# Patient Record
Sex: MALE | Race: WHITE | NOT HISPANIC OR LATINO | URBAN - METROPOLITAN AREA
[De-identification: names, ages, dates, MRNs, and addresses within clinical notes are randomized per-mention and may not be internally consistent; named-entity substitution may affect disease eponyms.]

---

## 2019-10-18 ENCOUNTER — EMERGENCY (EMERGENCY)
Facility: HOSPITAL | Age: 54
LOS: 1 days | Discharge: HOME | End: 2019-10-18
Attending: EMERGENCY MEDICINE | Admitting: EMERGENCY MEDICINE
Payer: SELF-PAY

## 2019-10-18 VITALS
RESPIRATION RATE: 18 BRPM | DIASTOLIC BLOOD PRESSURE: 89 MMHG | HEART RATE: 81 BPM | SYSTOLIC BLOOD PRESSURE: 150 MMHG | TEMPERATURE: 98 F | WEIGHT: 225.09 LBS | OXYGEN SATURATION: 98 % | HEIGHT: 67 IN

## 2019-10-18 VITALS
HEART RATE: 85 BPM | SYSTOLIC BLOOD PRESSURE: 142 MMHG | DIASTOLIC BLOOD PRESSURE: 81 MMHG | RESPIRATION RATE: 20 BRPM | TEMPERATURE: 98 F | OXYGEN SATURATION: 99 %

## 2019-10-18 PROCEDURE — 99283 EMERGENCY DEPT VISIT LOW MDM: CPT

## 2019-10-18 RX ORDER — CIPROFLOXACIN/HYDROCORTISONE 0.2 %-1 %
3 SUSPENSION, DROPS(FINAL DOSAGE FORM)(ML) OTIC (EAR)
Qty: 5 | Refills: 0
Start: 2019-10-18 | End: 2019-10-24

## 2019-10-18 NOTE — ED PROVIDER NOTE - PATIENT PORTAL LINK FT
You can access the FollowMyHealth Patient Portal offered by [facility] by registering at the following website: [email or web address]. By joining [employer]’s FollowMyHealth portal, you will also be able to view your health information using other applications (apps) compatible with our system.

## 2019-10-18 NOTE — ED PROVIDER NOTE - ATTENDING CONTRIBUTION TO CARE
Pt is a 52 yo male who presents to the ED with a cc of right ear pain.  Pt with no significant past medical history.  Reports that he developed right ear pain around the 11th.  He followed up with urgent care and was diagnosed with acute otitis media and reports that he was also told that he had a perforated right TM.  He was placed on a course of po Augmentin which he is also taking.  Pt reports that today he developed a fullness/pressure in his ear.  He also noted some ringing but denies noting ringing at this time.  He went back to urgent care today and was told that he has "pus" in his ear canal and that he should follow up with ENT.  Pt became concerned and came to the ED to have the fluid, infection drained from his ear.  Pt reports that he can still hear from his ear but that sounds are muffled.  Denies fever, chills, N/V, CP, SOB, abd pain.  Denies HA, visual changes.  On exam pt sitting in bed NAD, NCAT, PERRL, EOMI, heart RRR, lungs CTA, abd soft NT/ND.  TM mild effusion no redness TM intact and non bulging.  Right TM: external otitis noted with purulent discharge seen.  Unable to visual TM at this time.  No TTP to mastoid region.  Will add treatment for external otitis.  Pt to follow up with ENT and complete course of po Augmentin.  Agree with above plan of care

## 2019-10-18 NOTE — ED ADULT NURSE NOTE - OBJECTIVE STATEMENT
Patient complaining of ringing in right ear x10 days. Pt. stated he was seen by urgent care last week and prescribed antibiotics. Pt. reported 2 days ago right ear was noted , "stuffed and liquid is coming out". Pt. stated he was seen in urgent care and advised there is puss in right ear drum at this time. Pt. denied fevers. Redness and swelling to outer right ear.

## 2019-10-18 NOTE — ED PROVIDER NOTE - OBJECTIVE STATEMENT
54 yo M no sig PMHx presents to ED c/o R ear discomfort, fullness and tinnitus x today. Pt was diagnosed with R TM perf 7 days ago and started on 10 day course of Augmentin. Pt developed new symptoms today, as stated above. As per pt, went back to UC today, was told he had drainage in his canal and instructed to f/u with ENT. Pt decided instead to come to the ED for eval. Denies fever/chills, HA, dizziness.

## 2019-10-18 NOTE — ED ADULT TRIAGE NOTE - CHIEF COMPLAINT QUOTE
c/o of right ear ringing, Pt was seen at Urgent care last week and was told he has small break on the ear drum, was prescribed Augmentin and today, Doctor said he has Pus on the right ear. No fever

## 2019-10-18 NOTE — ED PROVIDER NOTE - RIGHT EAR
+edema noted to canal with purulent drainage, unable to visual TM/DRAINAGE/AURICULAR/TRAGAL TENDERNESS/EXTERNAL CANAL INFLAMMATION

## 2019-10-18 NOTE — ED PROVIDER NOTE - NSFOLLOWUPINSTRUCTIONS_ED_ALL_ED_FT
Take medication as directed. Finish oral antibiotics. F/u with ENT within the next 3 days. Return to the ED immediately for new or worsening symptoms.

## 2019-10-18 NOTE — ED PROVIDER NOTE - CARE PROVIDER_API CALL
Navid Shepard)  Otolaryngology  [address], [address]  [place] [address]  Phone: [phone]  Fax: [phone]  Follow Up Time:

## 2019-10-21 PROBLEM — Z00.00 ENCOUNTER FOR PREVENTIVE HEALTH EXAMINATION: Status: ACTIVE | Noted: 2019-10-21

## 2021-12-02 ENCOUNTER — TRANSCRIPTION ENCOUNTER (OUTPATIENT)
Age: 56
End: 2021-12-02